# Patient Record
Sex: MALE | Race: BLACK OR AFRICAN AMERICAN | NOT HISPANIC OR LATINO | Employment: UNEMPLOYED | URBAN - METROPOLITAN AREA
[De-identification: names, ages, dates, MRNs, and addresses within clinical notes are randomized per-mention and may not be internally consistent; named-entity substitution may affect disease eponyms.]

---

## 2017-05-25 ENCOUNTER — WALK IN (OUTPATIENT)
Dept: URGENT CARE | Age: 47
End: 2017-05-25

## 2017-05-25 VITALS
WEIGHT: 190 LBS | BODY MASS INDEX: 30.53 KG/M2 | HEART RATE: 90 BPM | HEIGHT: 66 IN | TEMPERATURE: 96.8 F | DIASTOLIC BLOOD PRESSURE: 92 MMHG | SYSTOLIC BLOOD PRESSURE: 142 MMHG

## 2017-05-25 DIAGNOSIS — M79.604 LEG PAIN, BILATERAL: Primary | ICD-10-CM

## 2017-05-25 DIAGNOSIS — M79.605 LEG PAIN, BILATERAL: Primary | ICD-10-CM

## 2017-05-25 PROCEDURE — 99213 OFFICE O/P EST LOW 20 MIN: CPT | Performed by: FAMILY MEDICINE

## 2024-03-21 ENCOUNTER — HOSPITAL ENCOUNTER (EMERGENCY)
Facility: HOSPITAL | Age: 54
Discharge: HOME | End: 2024-03-21
Attending: EMERGENCY MEDICINE

## 2024-03-21 VITALS
WEIGHT: 220 LBS | HEART RATE: 82 BPM | TEMPERATURE: 98 F | BODY MASS INDEX: 33.34 KG/M2 | DIASTOLIC BLOOD PRESSURE: 68 MMHG | OXYGEN SATURATION: 98 % | RESPIRATION RATE: 19 BRPM | HEIGHT: 68 IN | SYSTOLIC BLOOD PRESSURE: 134 MMHG

## 2024-03-21 DIAGNOSIS — R55 SYNCOPE AND COLLAPSE: ICD-10-CM

## 2024-03-21 DIAGNOSIS — R55 SYNCOPE: ICD-10-CM

## 2024-03-21 LAB
ALBUMIN SERPL BCP-MCNC: 4.7 G/DL (ref 3.5–5.2)
ALP SERPL-CCNC: 61 U/L (ref 55–135)
ALT SERPL W/O P-5'-P-CCNC: 14 U/L (ref 10–44)
AMPHET+METHAMPHET UR QL: NEGATIVE
ANION GAP SERPL CALC-SCNC: 15 MMOL/L (ref 8–16)
AST SERPL-CCNC: 25 U/L (ref 10–40)
BARBITURATES UR QL SCN>200 NG/ML: NEGATIVE
BASOPHILS # BLD AUTO: 0.06 K/UL (ref 0–0.2)
BASOPHILS NFR BLD: 0.7 % (ref 0–1.9)
BENZODIAZ UR QL SCN>200 NG/ML: NEGATIVE
BILIRUB SERPL-MCNC: 0.4 MG/DL (ref 0.1–1)
BILIRUB UR QL STRIP: NEGATIVE
BNP SERPL-MCNC: <10 PG/ML (ref 0–99)
BUN SERPL-MCNC: 17 MG/DL (ref 6–20)
BZE UR QL SCN: NEGATIVE
CALCIUM SERPL-MCNC: 9.5 MG/DL (ref 8.7–10.5)
CANNABINOIDS UR QL SCN: NEGATIVE
CHLORIDE SERPL-SCNC: 104 MMOL/L (ref 95–110)
CK SERPL-CCNC: 334 U/L (ref 20–200)
CLARITY UR: CLEAR
CO2 SERPL-SCNC: 24 MMOL/L (ref 23–29)
COLOR UR: COLORLESS
CREAT SERPL-MCNC: 1.5 MG/DL (ref 0.5–1.4)
CREAT UR-MCNC: 80.8 MG/DL (ref 23–375)
D DIMER PPP IA.FEU-MCNC: 0.32 MG/L FEU
DIFFERENTIAL METHOD BLD: ABNORMAL
EOSINOPHIL # BLD AUTO: 0 K/UL (ref 0–0.5)
EOSINOPHIL NFR BLD: 0.2 % (ref 0–8)
ERYTHROCYTE [DISTWIDTH] IN BLOOD BY AUTOMATED COUNT: 13.1 % (ref 11.5–14.5)
EST. GFR  (NO RACE VARIABLE): 55 ML/MIN/1.73 M^2
GLUCOSE SERPL-MCNC: 100 MG/DL (ref 70–110)
GLUCOSE UR QL STRIP: NEGATIVE
HCT VFR BLD AUTO: 43.6 % (ref 40–54)
HGB BLD-MCNC: 15.1 G/DL (ref 14–18)
HGB UR QL STRIP: NEGATIVE
IMM GRANULOCYTES # BLD AUTO: 0.02 K/UL (ref 0–0.04)
IMM GRANULOCYTES NFR BLD AUTO: 0.2 % (ref 0–0.5)
KETONES UR QL STRIP: NEGATIVE
LACTATE SERPL-SCNC: 2.1 MMOL/L (ref 0.5–2.2)
LEUKOCYTE ESTERASE UR QL STRIP: NEGATIVE
LYMPHOCYTES # BLD AUTO: 1.1 K/UL (ref 1–4.8)
LYMPHOCYTES NFR BLD: 12.5 % (ref 18–48)
MCH RBC QN AUTO: 33.7 PG (ref 27–31)
MCHC RBC AUTO-ENTMCNC: 34.6 G/DL (ref 32–36)
MCV RBC AUTO: 97 FL (ref 82–98)
METHADONE UR QL SCN>300 NG/ML: NEGATIVE
MONOCYTES # BLD AUTO: 0.4 K/UL (ref 0.3–1)
MONOCYTES NFR BLD: 4.9 % (ref 4–15)
NEUTROPHILS # BLD AUTO: 7.3 K/UL (ref 1.8–7.7)
NEUTROPHILS NFR BLD: 81.5 % (ref 38–73)
NITRITE UR QL STRIP: NEGATIVE
NRBC BLD-RTO: 0 /100 WBC
OHS QRS DURATION: 142 MS
OHS QTC CALCULATION: 460 MS
OPIATES UR QL SCN: NEGATIVE
PCP UR QL SCN>25 NG/ML: NEGATIVE
PH UR STRIP: 6 [PH] (ref 5–8)
PLATELET # BLD AUTO: 240 K/UL (ref 150–450)
PMV BLD AUTO: 9.3 FL (ref 9.2–12.9)
POCT GLUCOSE: 95 MG/DL (ref 70–110)
POTASSIUM SERPL-SCNC: 3.8 MMOL/L (ref 3.5–5.1)
PROT SERPL-MCNC: 8.5 G/DL (ref 6–8.4)
PROT UR QL STRIP: NEGATIVE
RBC # BLD AUTO: 4.48 M/UL (ref 4.6–6.2)
SODIUM SERPL-SCNC: 143 MMOL/L (ref 136–145)
SP GR UR STRIP: 1.01 (ref 1–1.03)
TOXICOLOGY INFORMATION: NORMAL
TROPONIN I SERPL DL<=0.01 NG/ML-MCNC: <0.006 NG/ML (ref 0–0.03)
URN SPEC COLLECT METH UR: ABNORMAL
UROBILINOGEN UR STRIP-ACNC: NEGATIVE EU/DL
WBC # BLD AUTO: 8.93 K/UL (ref 3.9–12.7)

## 2024-03-21 PROCEDURE — 80307 DRUG TEST PRSMV CHEM ANLYZR: CPT | Performed by: EMERGENCY MEDICINE

## 2024-03-21 PROCEDURE — 85379 FIBRIN DEGRADATION QUANT: CPT | Performed by: EMERGENCY MEDICINE

## 2024-03-21 PROCEDURE — 25500020 PHARM REV CODE 255: Performed by: EMERGENCY MEDICINE

## 2024-03-21 PROCEDURE — 81003 URINALYSIS AUTO W/O SCOPE: CPT | Mod: 59 | Performed by: EMERGENCY MEDICINE

## 2024-03-21 PROCEDURE — 93005 ELECTROCARDIOGRAM TRACING: CPT

## 2024-03-21 PROCEDURE — 84484 ASSAY OF TROPONIN QUANT: CPT | Performed by: EMERGENCY MEDICINE

## 2024-03-21 PROCEDURE — 83880 ASSAY OF NATRIURETIC PEPTIDE: CPT | Performed by: EMERGENCY MEDICINE

## 2024-03-21 PROCEDURE — 85025 COMPLETE CBC W/AUTO DIFF WBC: CPT | Performed by: EMERGENCY MEDICINE

## 2024-03-21 PROCEDURE — 82962 GLUCOSE BLOOD TEST: CPT

## 2024-03-21 PROCEDURE — 80053 COMPREHEN METABOLIC PANEL: CPT | Performed by: EMERGENCY MEDICINE

## 2024-03-21 PROCEDURE — 93010 ELECTROCARDIOGRAM REPORT: CPT | Mod: ,,, | Performed by: INTERNAL MEDICINE

## 2024-03-21 PROCEDURE — 99285 EMERGENCY DEPT VISIT HI MDM: CPT | Mod: 25

## 2024-03-21 PROCEDURE — 83605 ASSAY OF LACTIC ACID: CPT | Performed by: EMERGENCY MEDICINE

## 2024-03-21 PROCEDURE — 82550 ASSAY OF CK (CPK): CPT | Performed by: EMERGENCY MEDICINE

## 2024-03-21 RX ADMIN — IOHEXOL 100 ML: 350 INJECTION, SOLUTION INTRAVENOUS at 02:03

## 2024-03-21 NOTE — Clinical Note
"Ovidio"Pat Alford was seen and treated in our emergency department on 3/21/2024.  He is medically cleared to fly on 03/21/2024.        If you have any questions or concerns, please don't hesitate to call.      Kolby Newby MD"

## 2024-03-21 NOTE — ED NOTES
Pt given discharge and follow up instructions. Pt verbalized understanding. Pt able to get out of bed and dress himself. Pt exited ER with family in stable condition with steady gait.

## 2024-03-21 NOTE — Clinical Note
"Ovidio"Pat Alford was seen and treated in our emergency department on 3/21/2024.  He may return with no restrictions on 03/21/2024.  Patient seen and evaluated in our emergency department.    Pt is cleared for flying.      Sincerely,      Kolby Newby MD    "

## 2024-03-21 NOTE — ED PROVIDER NOTES
"Encounter Date: 3/21/2024       History     Chief Complaint   Patient presents with    Loss of Consciousness     Pt was on an airplane from [place] to [facility] when he had a syncopal episode. Plane was diverted here. Pt arrived awake and alert with IV established.      Patient is a 53-year-old male with a history of hypertension who presents to the ED with complaint of loss of consciousness.  Patient was on a flight from [place] to [facility] when he reportedly lost consciousness.  Per his wife at bedside who was accompanying the patient on the flight, patient became diaphoretic and subsequently passed out.  He was immediately assessed by a nurse in a  on the plain and was reportedly "unresponsive.  The wife states that she does not believe the patient lost pulses.  The patient was placed in the supine position was found to have a blood pressure with a systolic in the 60s.  And IV was established on the aircraft and 500 cc of normal saline were initiated.  The patient was reportedly out for approximately 1 minute and return immediately to his baseline.  The wife states that there was no seizure-like activity.  The patient denies any preceding chest pain or shortness of breath or subsequent chest pain or shortness of breath.  The patient states that he ate [employer] prior to the flight.  He denies any use of illicit drugs or alcohol prior to the flight.  He denies any risk factors for venous thromboembolism.  He states he is similar episode like this approximately 6 months ago in the ER workup conducted time was unremarkable.  On arrival, the patient is hypertensive with a blood pressure in the 160 systolic.  He denies any complaints.  He is able to ambulate without difficulty.       Review of patient's allergies indicates:  No Known Allergies  No past medical history on file.  No past surgical history on file.  No family history on file.     Review of Systems   Constitutional:  Positive for diaphoresis " (resolved). Negative for chills.   Respiratory:  Negative for cough, chest tightness and shortness of breath.    Cardiovascular:  Negative for chest pain.   Gastrointestinal:  Negative for abdominal pain, nausea and vomiting.   Genitourinary:  Negative for flank pain.   Musculoskeletal:  Negative for back pain and myalgias.   Neurological:  Negative for dizziness, tremors, weakness and headaches.   Psychiatric/Behavioral:  Negative for agitation and confusion.        Physical Exam     Initial Vitals [03/21/24 0946]   BP Pulse Resp Temp SpO2   133/89 73 20 98.4 °F (36.9 °C) 98 %      MAP       --         Physical Exam    Nursing note and vitals reviewed.  Constitutional: He appears well-developed and well-nourished. No distress.   Well-appearing   No acute distress noted    HENT:   Head: Normocephalic and atraumatic.   Right Ear: External ear normal.   Left Ear: External ear normal.   Eyes: Conjunctivae and EOM are normal. Pupils are equal, round, and reactive to light.   Neck: Neck supple.   Normal range of motion.  Cardiovascular:  Normal rate, regular rhythm, normal heart sounds and intact distal pulses.           Pulmonary/Chest: Breath sounds normal.   Lungs clear to auscultation bilaterally.    Abdominal: Abdomen is soft. Bowel sounds are normal.   Musculoskeletal:         General: Normal range of motion.      Cervical back: Normal range of motion and neck supple.     Neurological: He is alert and oriented to person, place, and time. He has normal strength. GCS score is 15. GCS eye subscore is 4. GCS verbal subscore is 5. GCS motor subscore is 6.   No focal neurological deficit.  Strength 5/5   Sensation grossly in tact  MAEW  GCS 15  AAOX3  Gait WNL    Skin: Skin is warm. Capillary refill takes less than 2 seconds.   Psychiatric: He has a normal mood and affect.         ED Course   Procedures  Labs Reviewed   CBC W/ AUTO DIFFERENTIAL - Abnormal; Notable for the following components:       Result Value    RBC  4.48 (*)     MCH 33.7 (*)     Gran % 81.5 (*)     Lymph % 12.5 (*)     All other components within normal limits   COMPREHENSIVE METABOLIC PANEL - Abnormal; Notable for the following components:    Creatinine 1.5 (*)     Total Protein 8.5 (*)     eGFR 55 (*)     All other components within normal limits   CK - Abnormal; Notable for the following components:     (*)     All other components within normal limits   URINALYSIS, REFLEX TO URINE CULTURE - Abnormal; Notable for the following components:    Color, UA Colorless (*)     All other components within normal limits    Narrative:     Specimen Source->Urine   TROPONIN I   B-TYPE NATRIURETIC PEPTIDE   D DIMER, QUANTITATIVE   DRUG SCREEN PANEL, URINE EMERGENCY    Narrative:     Specimen Source->Urine   LACTIC ACID, PLASMA   POCT GLUCOSE     EKG Readings: (Independently Interpreted)   RBBB; no ischemic change; no STEMI      ECG Results              EKG 12-lead (Final result)        Collection Time Result Time QRS Duration OHS QTC Calculation    03/21/24 10:58:41 03/21/24 17:00:04 142 460                     Final result by Interface, Lab In [facility] (03/21/24 17:00:08)                   Narrative:    Test Reason : R55,    Vent. Rate : 081 BPM     Atrial Rate : 081 BPM     P-R Int : 168 ms          QRS Dur : 142 ms      QT Int : 396 ms       P-R-T Axes : 056 -55 051 degrees     QTc Int : 460 ms    Normal sinus rhythm  Right bundle branch block  Left anterior fascicular block   Bifascicular block   Abnormal ECG  No previous ECGs available  Confirmed by Santos Thomas MD ([id number]) on 3/21/2024 5:00:00 PM    Referred By: System System           Confirmed By:Santos Thomas MD                                  Imaging Results              CTA Head and Neck (xpd) (Final result)  Result time 03/21/24 14:47:20      Final result by Herberth Jean DO (03/21/24 14:47:20)                   Impression:      CTA head: Unremarkable CTA of the head specifically without  evidence for proximal significant stenosis or occlusion.    CTA neck: No significant arterial stenosis throughout the neck..    CT head: No evidence for acute intracranial hemorrhage or sulcal effacement to suggest large territory recent infarction.  Clinical correlation and further evaluation with MRI as warranted if patient compatible.      Electronically signed by: Herberth Jean DO  Date:    03/21/2024  Time:    14:47               Narrative:    EXAMINATION:  CTA HEAD AND NECK (XPD)    CLINICAL HISTORY:  Syncope, recurrent;    TECHNIQUE:  5 mm axial images of the head pre and post contrast with 0.625 mm axial CTA images of the head neck post-contrast.  Coronal and sagittal MPR and MIP imaging was performed 100 ml of Omnipaque 350 contrast was injected intravenously    COMPARISON:  None    FINDINGS:  CT head with and  without contrast: There is no evidence for acute intracranial hemorrhage or sulcal effacement.  Ventricles normal in size without hydrocephalus.  No midline shift or mass effect.  Allowing for CT technique there is no abnormal parenchymal enhancement.  Mild mucosal thickening inferior right maxillary antra no significant opacification remaining paranasal sinuses or mastoid air cells    CTA head:    Anterior circulation: The bilateral distal cervical, petrous, cavernous, and supraclinoid segments of the ICAs are patent without significant focal stenosis or aneurysm.    The anterior middle cerebral arteries are patent without focal stenosis or aneurysm.    Posterior circulation: The distal vertebral arteries, basilar artery and posterior cerebral arteries are patent without focal stenosis or aneurysm.  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    CTA neck: Arch and origin great vessels from the arch are within normal limits with the left vertebral artery  originating from the arch posterior to the subclavian artery.  The left vertebral arteries patent throughout its course without significant focal stenosis.  The right vertebral artery origin from the right subclavian arteries within normal is.  The right vertebral artery is patent throughout its course    Right carotid: The right common carotid artery, carotid bifurcation and extracranial portions of the internal carotid artery are patent without significant focal stenosis.    Left carotid: The left common carotid artery, carotid bifurcation and extracranial portions of the internal carotid arteries are patent without significant focal stenosis.    Please note there is tortuous configuration distal cervical ICAs allowing for tortuosity no significant focal narrowing.    There is less than 50% stenosis of the proximal ICAs by NASCET criteria.    Pharynx/larynx: Evaluation distorted by scatter artifact from dental metal and motion allowing for artifacts the nasopharynx is within normal limits.  Prominence of the palatine tonsils with calcifications suggestive for sequela of prior infectious process.  No definite hypopharyngeal or angio lesion.  Visualized trachea unremarkable.  No consolidation visualized lungs.    There is a prominent midline submental lymph node measuring 0.9 cm image 194 series 5 which is indeterminate.  Few scattered prominent lymph nodes elsewhere throughout the neck without lymphadenopathy by size criteria    No focal parotid, submandibular or thyroid gland lesion.    Degenerative change of the cervical spine without evidence for acute fracture or subluxation.                                       X-Ray Chest AP Portable (Final result)  Result time 03/21/24 11:13:20      Final result by Trinidad Chang MD (03/21/24 11:13:20)                   Impression:      No acute abnormality.      Electronically signed by: Trinidad Chang MD  Date:    03/21/2024  Time:    11:13                Narrative:    EXAMINATION:  XR CHEST AP PORTABLE    CLINICAL HISTORY:  Syncope and collapse    TECHNIQUE:  Single frontal view of the chest was performed.    COMPARISON:  None    FINDINGS:  The lungs are clear, with normal appearance of pulmonary vasculature and no pleural effusion or pneumothorax.    The cardiac silhouette is normal in size. The hilar and mediastinal contours are unremarkable.    Bones are intact.                                       Medications   iohexoL (OMNIPAQUE 350) injection 100 mL (100 mLs Intravenous Given 3/21/24 1418)     Medical Decision Making  Amount and/or Complexity of Data Reviewed  Labs: ordered. Decision-making details documented in ED Course.  Radiology: ordered. Decision-making details documented in ED Course.    Risk  Prescription drug management.               ED Course as of 03/21/24 1852   Thu Mar 21, 2024   1117 X-Ray Chest AP Portable  No acute cardiopulmonary process per my independent interpretation.  [LC]   1135 WBC: 8.93 [LC]   1135 Hemoglobin: 15.1 [LC]   1135 Lactic Acid Level: 2.1 [LC]   1200 RBC(!): 4.48 [LC]   1200 Creatinine(!): 1.5 [LC]   1223 Color, UA(!): Colorless [LC]   1223 Specific Gravity, UA: 1.010 [LC]   1223 Protein, UA: Negative [LC]   1223 Glucose, UA: Negative [LC]   1223 Ketones, UA: Negative [LC]   1223 Bilirubin (UA): Negative [LC]   1851 CTA Head and Neck (xpd)  CTA head: Unremarkable CTA of the head specifically without evidence for proximal significant stenosis or occlusion.     CTA neck: No significant arterial stenosis throughout the neck..     CT head: No evidence for acute intracranial hemorrhage or sulcal effacement to suggest large territory recent infarction.   [LC]      ED Course User Index  [LC] Kolby Newby MD                 Medical Decision Making:   Initial Assessment:   See HPI   Clinical Tests:   Lab Tests: Reviewed and Ordered  Radiological Study: Ordered and Reviewed  ED Management:  - ED workup, including troponin,  BNP, CTA head/neck, CXR  - pt may have experienced transient hypotension while in flight  - low suspicion for PE or DVT as pt's D-dimer is normal  - pt observed for short period of time in ED without untoward event  - pt requesting discharge  - No further intervention is indicated at this time after having taken into account the patient's history, physical exam findings, and empirical and objective data obtained during the patient's emergency department workup.   - The patient is at low risk for an emergent medical condition at this time, and I am of the belief that that it is safe to discharge the patient from the emergency department.   - The patient is instructed to follow up as outpatient as indicated on the discharge paperwork.    - I have discussed the specifics of the workup with the patient and the patient has verbalized understanding of the details of the workup, the diagnosis, the treatment plan, and the need for outpatient follow-up.    - Although the patient has no emergent etiology today this does not preclude the development of an emergent condition so, in addition, I have advised the patient that they can return to the ED and/or activate EMS at any time with worsening of their symptoms, change of their symptoms, or with any other medical complaint.    - The patient remained comfortable and stable during their visit in the ED.    - Discharge and follow-up instructions discussed with the patient who expressed understanding and willingness to comply with my recommendations.  - Results of all emergency department tests  discussed thoroughly with patient; all patient questions answered; pt in agreement with plan  - Pt instructed to follow up with PCP in 2-3 days for recheck of today's complaints  - Pt given strict emergency department return precautions for any new or worsening of symptoms  - Pt discharged from the emergency department in stable condition, in no acute distress                 Clinical  Impression:  Final diagnoses:  [R55] Syncope and collapse  [R55] Syncope          ED Disposition Condition    Discharge Stable          ED Prescriptions    None       Follow-up Information    None          Kolby Newby MD  03/21/24 2551

## 2024-03-21 NOTE — ED NOTES
States he had breakfast in the airport, boarded his flight. Wife told him that he was sweating a lot, and next thing Pt knew, he was waking up on the floor. Denies any light-headedness prior to episode. States this happened approx 6months ago as well, whilst sitting in a restaurant. States that he was evaluated for his heart at that time. Pt states he may have sleep apnea, but never evaluated for it.